# Patient Record
Sex: FEMALE | URBAN - METROPOLITAN AREA
[De-identification: names, ages, dates, MRNs, and addresses within clinical notes are randomized per-mention and may not be internally consistent; named-entity substitution may affect disease eponyms.]

---

## 2022-12-09 ENCOUNTER — OFFICE VISIT (OUTPATIENT)
Dept: URBAN - METROPOLITAN AREA CLINIC 105 | Facility: CLINIC | Age: 37
End: 2022-12-09
Payer: OTHER GOVERNMENT

## 2022-12-09 ENCOUNTER — DASHBOARD ENCOUNTERS (OUTPATIENT)
Age: 37
End: 2022-12-09

## 2022-12-09 ENCOUNTER — WEB ENCOUNTER (OUTPATIENT)
Dept: URBAN - METROPOLITAN AREA CLINIC 105 | Facility: CLINIC | Age: 37
End: 2022-12-09

## 2022-12-09 ENCOUNTER — LAB OUTSIDE AN ENCOUNTER (OUTPATIENT)
Dept: URBAN - METROPOLITAN AREA CLINIC 105 | Facility: CLINIC | Age: 37
End: 2022-12-09

## 2022-12-09 VITALS
BODY MASS INDEX: 22.79 KG/M2 | WEIGHT: 136.8 LBS | HEART RATE: 70 BPM | TEMPERATURE: 97.2 F | SYSTOLIC BLOOD PRESSURE: 124 MMHG | HEIGHT: 65 IN | DIASTOLIC BLOOD PRESSURE: 75 MMHG

## 2022-12-09 DIAGNOSIS — K62.5 RECTAL BLEEDING: ICD-10-CM

## 2022-12-09 DIAGNOSIS — R71.8 MICROCYTOSIS: ICD-10-CM

## 2022-12-09 PROCEDURE — 99204 OFFICE O/P NEW MOD 45 MIN: CPT | Performed by: INTERNAL MEDICINE

## 2022-12-09 PROCEDURE — 99244 OFF/OP CNSLTJ NEW/EST MOD 40: CPT | Performed by: INTERNAL MEDICINE

## 2022-12-09 RX ORDER — POLYETHYLENE GLYCOL-3350, SODIUM CHLORIDE, POTASSIUM CHLORIDE AND SODIUM BICARBONATE 420; 11.2; 5.72; 1.48 G/438.4G; G/438.4G; G/438.4G; G/438.4G
AS DIRECTED POWDER, FOR SOLUTION ORAL ONCE
Qty: 1 KIT | Refills: 0 | OUTPATIENT
Start: 2022-12-09 | End: 2022-12-10

## 2022-12-09 RX ORDER — BACILLUS COAGULANS/INULIN 1B-250 MG
AS DIRECTED CAPSULE ORAL
Status: ACTIVE | COMMUNITY

## 2022-12-09 RX ORDER — CETIRIZINE HYDROCHLORIDE 10 MG/1
1 TABLET TABLET, FILM COATED ORAL ONCE A DAY
Status: ACTIVE | COMMUNITY

## 2022-12-09 NOTE — HPI-TODAY'S VISIT:
12/9/22 38 yo lady pt of Dr Armaan Morrison. REferred for GI complaints . A copy of this note will be sent to him. Says she is having sensations of skin crawling and itching. Saw allergist and is getting meds.  Recently got a candida infection  and treated for 3 months - says it was a blood test with an internist but also says it was not in her blood. She was never hospitalized . Wonders if she has a parasite - this is why she is here.  No diarrhea. Has stable bowel habits. BMs are regular - no constipation or diarrhea.  NO recent travel outside of the country. WAs in [place] last year 10/2022 - this is when all of this flared up.  Had a stool test with another GI MD last year in [place] - this was negative. says at the time had diarrhea and blood in stool. Says she had an EGD which was negative.  Did not have a colonoscopy.    I have noted labs from 8/2022 showing nl Hb 13.6 with mcv 77. It also shows IgG ab to candida with neg IgM.